# Patient Record
Sex: MALE | Race: WHITE | NOT HISPANIC OR LATINO | Employment: FULL TIME | URBAN - METROPOLITAN AREA
[De-identification: names, ages, dates, MRNs, and addresses within clinical notes are randomized per-mention and may not be internally consistent; named-entity substitution may affect disease eponyms.]

---

## 2020-09-19 ENCOUNTER — OFFICE VISIT (OUTPATIENT)
Dept: URGENT CARE | Facility: CLINIC | Age: 30
End: 2020-09-19
Payer: OTHER GOVERNMENT

## 2020-09-19 ENCOUNTER — APPOINTMENT (OUTPATIENT)
Dept: RADIOLOGY | Facility: CLINIC | Age: 30
End: 2020-09-19
Payer: OTHER GOVERNMENT

## 2020-09-19 VITALS
TEMPERATURE: 98.7 F | BODY MASS INDEX: 27.59 KG/M2 | WEIGHT: 215 LBS | RESPIRATION RATE: 16 BRPM | DIASTOLIC BLOOD PRESSURE: 82 MMHG | HEART RATE: 70 BPM | HEIGHT: 74 IN | SYSTOLIC BLOOD PRESSURE: 120 MMHG | OXYGEN SATURATION: 98 %

## 2020-09-19 DIAGNOSIS — M25.571 ACUTE RIGHT ANKLE PAIN: ICD-10-CM

## 2020-09-19 DIAGNOSIS — M25.571 ACUTE RIGHT ANKLE PAIN: Primary | ICD-10-CM

## 2020-09-19 PROCEDURE — 73610 X-RAY EXAM OF ANKLE: CPT

## 2020-09-19 PROCEDURE — G0383 LEV 4 HOSP TYPE B ED VISIT: HCPCS | Performed by: PHYSICIAN ASSISTANT

## 2020-09-19 NOTE — PROGRESS NOTES
[id number]        NAME: John Mays is a 34 y o  male  : 1990    MRN: [id number]  DATE: 2020  TIME: 1:59 PM    Assessment and Plan   Acute right ankle pain [M25 [id number]]  1  Acute right ankle pain  XR ankle 3+ vw right         Patient Instructions     Follow up with PCP in 3-5 days  Proceed to  ER if symptoms worsen  Chief Complaint     Chief Complaint   Patient presents with    Ankle Pain     R ankle pain, pt stepped down off step into small hole and rolled R ankle this AM         History of Present Illness       28-year-old male presents for evaluation of right ankle pain  Patient on a step down and he well as ankle into a small fall  Patient states the swelling has worsened  He has no previous injury to this ankle  Denies fever chills  Review of Systems   Review of Systems   Constitutional: Negative for chills, fatigue and fever  HENT: Negative for congestion, ear pain, sinus pain, sore throat and trouble swallowing  Eyes: Negative for pain, discharge and redness  Respiratory: Negative for cough, chest tightness, shortness of breath and wheezing  Cardiovascular: Negative for chest pain, palpitations and leg swelling  Gastrointestinal: Negative for abdominal pain, diarrhea, nausea and vomiting  Musculoskeletal: Positive for arthralgias and joint swelling  Negative for myalgias  Skin: Negative for rash  Neurological: Negative for dizziness, weakness, numbness and headaches  Current Medications     No current outpatient medications on file  Current Allergies     Allergies as of 2020    (No Known Allergies)            The following portions of the patient's history were reviewed and updated as appropriate: allergies, current medications, past family history, past medical history, past social history, past surgical history and problem list      History reviewed  No pertinent past medical history  History reviewed   No pertinent surgical history  History reviewed  No pertinent family history  Medications have been verified  Objective   /82   Pulse 70   Temp 98 7 °F (37 1 °C) (Temporal)   Resp 16   Ht 6' 2" (1 88 m)   Wt 97 5 kg (215 lb)   SpO2 98%   BMI 27 60 kg/m²        Physical Exam     Physical Exam  Constitutional:       General: He is not in acute distress  Appearance: He is well-developed  Cardiovascular:      Rate and Rhythm: Normal rate and regular rhythm  Heart sounds: Normal heart sounds  Pulmonary:      Effort: Pulmonary effort is normal       Breath sounds: Normal breath sounds  Musculoskeletal:      Right ankle: He exhibits swelling  He exhibits no ecchymosis  Tenderness  Lateral malleolus tenderness found   Achilles tendon normal       Right foot: Normal